# Patient Record
Sex: MALE | Race: WHITE | NOT HISPANIC OR LATINO | Employment: FULL TIME | URBAN - METROPOLITAN AREA
[De-identification: names, ages, dates, MRNs, and addresses within clinical notes are randomized per-mention and may not be internally consistent; named-entity substitution may affect disease eponyms.]

---

## 2023-10-14 ENCOUNTER — APPOINTMENT (OUTPATIENT)
Dept: RADIOLOGY | Facility: MEDICAL CENTER | Age: 43
End: 2023-10-14
Attending: STUDENT IN AN ORGANIZED HEALTH CARE EDUCATION/TRAINING PROGRAM
Payer: COMMERCIAL

## 2023-10-14 ENCOUNTER — HOSPITAL ENCOUNTER (EMERGENCY)
Facility: MEDICAL CENTER | Age: 43
End: 2023-10-14
Attending: STUDENT IN AN ORGANIZED HEALTH CARE EDUCATION/TRAINING PROGRAM
Payer: COMMERCIAL

## 2023-10-14 ENCOUNTER — OFFICE VISIT (OUTPATIENT)
Dept: URGENT CARE | Facility: CLINIC | Age: 43
End: 2023-10-14
Payer: COMMERCIAL

## 2023-10-14 VITALS
HEIGHT: 73 IN | TEMPERATURE: 97.1 F | HEART RATE: 99 BPM | WEIGHT: 194 LBS | BODY MASS INDEX: 25.71 KG/M2 | OXYGEN SATURATION: 96 % | RESPIRATION RATE: 16 BRPM | SYSTOLIC BLOOD PRESSURE: 128 MMHG | DIASTOLIC BLOOD PRESSURE: 80 MMHG

## 2023-10-14 VITALS
RESPIRATION RATE: 16 BRPM | OXYGEN SATURATION: 96 % | SYSTOLIC BLOOD PRESSURE: 133 MMHG | BODY MASS INDEX: 26.24 KG/M2 | WEIGHT: 198.85 LBS | HEART RATE: 77 BPM | TEMPERATURE: 97.7 F | DIASTOLIC BLOOD PRESSURE: 75 MMHG

## 2023-10-14 DIAGNOSIS — S61.215A LACERATION OF LEFT RING FINGER WITHOUT FOREIGN BODY WITHOUT DAMAGE TO NAIL, INITIAL ENCOUNTER: ICD-10-CM

## 2023-10-14 DIAGNOSIS — S61.412A LACERATION OF LEFT HAND, FOREIGN BODY PRESENCE UNSPECIFIED, INITIAL ENCOUNTER: ICD-10-CM

## 2023-10-14 PROCEDURE — 90715 TDAP VACCINE 7 YRS/> IM: CPT | Performed by: STUDENT IN AN ORGANIZED HEALTH CARE EDUCATION/TRAINING PROGRAM

## 2023-10-14 PROCEDURE — 99205 OFFICE O/P NEW HI 60 MIN: CPT

## 2023-10-14 PROCEDURE — 3074F SYST BP LT 130 MM HG: CPT

## 2023-10-14 PROCEDURE — 99283 EMERGENCY DEPT VISIT LOW MDM: CPT

## 2023-10-14 PROCEDURE — 3079F DIAST BP 80-89 MM HG: CPT

## 2023-10-14 PROCEDURE — 90471 IMMUNIZATION ADMIN: CPT

## 2023-10-14 PROCEDURE — 304999 HCHG REPAIR-SIMPLE/INTERMED LEVEL 1

## 2023-10-14 PROCEDURE — 304217 HCHG IRRIGATION SYSTEM

## 2023-10-14 PROCEDURE — 73130 X-RAY EXAM OF HAND: CPT | Mod: LT

## 2023-10-14 PROCEDURE — 700111 HCHG RX REV CODE 636 W/ 250 OVERRIDE (IP): Performed by: STUDENT IN AN ORGANIZED HEALTH CARE EDUCATION/TRAINING PROGRAM

## 2023-10-14 PROCEDURE — 303747 HCHG EXTRA SUTURE

## 2023-10-14 RX ORDER — ATORVASTATIN CALCIUM 40 MG/1
TABLET, FILM COATED ORAL
COMMUNITY
Start: 2023-09-09

## 2023-10-14 RX ORDER — OXYCODONE HCL 10 MG/1
10 TABLET, FILM COATED, EXTENDED RELEASE ORAL ONCE
Status: DISCONTINUED | OUTPATIENT
Start: 2023-10-14 | End: 2023-10-14

## 2023-10-14 RX ADMIN — CLOSTRIDIUM TETANI TOXOID ANTIGEN (FORMALDEHYDE INACTIVATED), CORYNEBACTERIUM DIPHTHERIAE TOXOID ANTIGEN (FORMALDEHYDE INACTIVATED), BORDETELLA PERTUSSIS TOXOID ANTIGEN (GLUTARALDEHYDE INACTIVATED), BORDETELLA PERTUSSIS FILAMENTOUS HEMAGGLUTININ ANTIGEN (FORMALDEHYDE INACTIVATED), BORDETELLA PERTUSSIS PERTACTIN ANTIGEN, AND BORDETELLA PERTUSSIS FIMBRIAE 2/3 ANTIGEN 0.5 ML: 5; 2; 2.5; 5; 3; 5 INJECTION, SUSPENSION INTRAMUSCULAR at 18:57

## 2023-10-14 RX ADMIN — LIDOCAINE HYDROCHLORIDE 20 ML: 10 INJECTION, SOLUTION EPIDURAL; INFILTRATION; INTRACAUDAL at 19:01

## 2023-10-14 ASSESSMENT — ENCOUNTER SYMPTOMS
NAUSEA: 0
CHILLS: 0
FEVER: 0
VOMITING: 0

## 2023-10-15 NOTE — ED NOTES
ERP educated patient on discharge instructions. Patient instructed to follow up with surgeon and return to ED if any new or worsening symptoms occur. Patient verbalized understanding of instructions and signed statement of discharge paperwork (AVS) being received.

## 2023-10-15 NOTE — ED PROVIDER NOTES
"ED Provider Note    CHIEF COMPLAINT  Chief Complaint   Patient presents with    Hand Injury     Left hand  Pt states, \"I impaled by hand while working on my house.\"       EXTERNAL RECORDS REVIEWED  Patient seen at urgent care prior to arrival. There was concern for possible extensor tendon injury and patient was referred to the emergency department.    HPI/ROS  LIMITATION TO HISTORY   None  OUTSIDE HISTORIAN(S):  None    Girma Lazaro is a 43 y.o. male who presents with left hand laceration.  Patient says that he was doing some renovation at home and demolishing a tub when he hit the top with a sledgehammer and a piece of porcelain cut through his left hand.  He has full movement of his fingers.  He has no numbness.   He denies any other injuries.  He is right-hand dominant. Unknown last Tdap.     PAST MEDICAL HISTORY   Denies    SURGICAL HISTORY  patient denies any surgical history    FAMILY HISTORY  No family history on file.    SOCIAL HISTORY  Social History     Tobacco Use    Smoking status: Some Days     Types: Cigarettes, Cigars    Smokeless tobacco: Never   Vaping Use    Vaping Use: Never used   Substance and Sexual Activity    Alcohol use: Yes    Drug use: Not Currently    Sexual activity: Not on file       CURRENT MEDICATIONS  Home Medications       Reviewed by Natali Pitt R.N. (Registered Nurse) on 10/14/23 at 1749  Med List Status: Not Addressed     Medication Last Dose Status   atorvastatin (LIPITOR) 40 MG Tab  Active                    ALLERGIES  No Known Allergies    PHYSICAL EXAM  VITAL SIGNS: /75   Pulse 77   Temp 36.5 °C (97.7 °F) (Temporal)   Resp 16   Wt 90.2 kg (198 lb 13.7 oz)   SpO2 96%   BMI 26.24 kg/m²    Constitutional: Awake and alert. Nontoxic  HENT:  Grossly normal  Eyes: Grossly normal  Neck: Normal range of motion  Cardiovascular: Normal heart rate   Thorax & Lungs: No respiratory distress  Abdomen: Nontender  Skin:  No pathologic rash.   Extremities: He has " approximately 1.5 cm laceration to the dorsal aspect of left hand overlying the fourth MCP.  There is exposure of the underlying soft tissue.  No visible tendon laceration or exposure of the underlying bone.  2 point discrimination intact in the affected digit.  Full ROM intact at the PIP and DIP of affected digit through flexion and extension. Brisk cap refill.   Psychiatric: Affect normal      DIAGNOSTIC STUDIES / PROCEDURES        RADIOLOGY  I have independently interpreted the diagnostic imaging associated with this visit and am waiting the final reading from the radiologist.   My preliminary interpretation is as follows: No obvious retained foreign object  Radiologist interpretation:   DX-HAND 3+ LEFT   Final Result      1.  No fracture or dislocation of LEFT hand.   2.  No radiopaque foreign body demonstrated.        Procedure - Laceration closure  Location: Left Hand, 4th MCP    Patient was positioned appropriately, 4cc lidocaine without epinephrine was used as a local anesthetic. 500cc NaCl was used for irrigation. Patient was sterile draped with wound exposed. 3 x 4.0 nylon sutures were placed with good approximation. Procedure tolerated without complications.       COURSE & MEDICAL DECISION MAKING    ED Observation Status? No; Patient does not meet criteria for ED Observation.     INITIAL ASSESSMENT, COURSE AND PLAN  Care Narrative: Patients vitals normal and is well appearing.  Physical exam is notable for 1.5 cm laceration of the left hand without exposure of underlying bone or tendon. Neurovascularly intact distal to injury.  X-ray obtained without any obvious fracture or retained foreign object.  Wound was explored and irrigated with 500cc normal saline, by myself without any evidence of retained foreign object or contamination.  Of note, patient was evaluated at urgent care prior to arrival and there was concern for possible extensor tendon injury.  Patient is able to extend his 4th finger on physical  exam which is reassuring.  I discussed with Dr. Mayo (Orthopedics) and explained to him that I was unable to visualize any extensor tendon injury and therefore would be unable to repair the tendon, if there were a more subtle partial tendon laceration.   He reassured me that he would not recommend repair of extensor tendon, especially given normal physical exam.  The laceration was repaired with sutures as above.  Laceration unlikely contaminated and no antibiotics indicated at this time. Tdap updated.  Neurosensory and circulatory exams same as prior to repair.  A clean dressing was applied and finger splint applied, in an abundance of caution.  I discussed wound care precautions and need for follow-up for suture removal.  All his questions were answered and he was discharged in good condition.      DISPOSITION AND DISCUSSIONS  I have discussed management of the patient with the following physicians and PIEDAD's:  Dr. Mayo (Orthopedics)    Discussion of management with other QHP or appropriate source(s): None     Decision tools and prescription drugs considered including, but not limited to: Antibiotics Not indicated .    FINAL DIAGNOSIS  1. Laceration of left ring finger without foreign body without damage to nail, initial encounter Acute          Electronically signed by: Tessy Garner M.D., 10/14/2023 6:32 PM

## 2023-10-15 NOTE — ED NOTES
Pt was demolishing a tub and brought the sledge hammer to hit the tub and a piece of porcelain cut thru his left hand, pt went to  and was told to come to get checked out

## 2023-10-15 NOTE — ED TRIAGE NOTES
"Girma Tacos Lazaro  43 y.o. male  Chief Complaint   Patient presents with    Hand Injury     Left hand  Pt states, \"I impaled by hand while working on my house.\"         Pt amb to triage with steady gait for above complaint. CMS intact to left hand  Pt is alert and oriented, speaking in full sentences, follows commands and responds appropriately to questions. Not in any apparent distress. Respirations are even and unlabored.  Pt placed in lobby. Pt educated on triage process. Pt encouraged to alert staff for any changes.    "

## 2023-10-15 NOTE — DISCHARGE INSTRUCTIONS
Your wound was repaired with stitches.  These will need to be removed in 10-14 days.     Please leave the current dressing in place for 24 hours. Then begin cleaning the wound with soap and water - do not scrub, but let the soapy water run over it.  Pat the wound dry; do not rub it.  Please do this twice daily and change the dressing after each clean. If the bandage becomes wet or dirty change it  Please see your primary care physician in that time window to have your wound evaluated to ensure it is healing properly.  If you cannot get an appointment with your primary physician, go to an Urgent Care or return to an Emergency Department for this. Do not soak/immerse the wound for at least 4-6 weeks - this means no hot tubs, pools, baths, or swimming until the wound has completely healed.    Scar formation is inevitable, but one of the greatest risks for scar formation is severe sun exposure or sunburns to the wounded area. For the next 9 months, please use hats, sunscreen, or other clothing to try and minimize the sun exposure to the wound. Some wounds can benefit from scar revision surgery, so please followup with your PCP or plastic surgeon as needed.     PAIN MEDICATIONS:   If you were prescribed pain medications, take them only as prescribed.  Do not take extra and do not use another person's pain medications.  Medications with opioids (e.g. Percocet, Vicodin, Norco, oxycodone, Dilaudid, etc..) can make you drowsy or confused.  Do not mix with alcohol.  Do not drive after taking these medications.     Often times, acetaminophen (Tylenol) or ibuprofen (Motrin, Advil, etc...) can be used as a first line pain regimen.  This is often sufficient for most pain associated with your wound.  If you do take these medications, especially Tylenol, make sure you do not mix this with other medications that contain Tylenol (e.g. Percocet).  Make sure you follow the instructions on the bottle to ensure you do not overdose on  these medications.     SEEK MEDICAL CARE IF:    You have redness, swelling, or increasing pain in the wound.  You see a red line that goes away from the wound.  You have yellowish-white fluid (pus) coming from the wound.  You have a fever.  You notice a bad smell coming from the wound or dressing.  Your wound breaks open before or after sutures have been removed.  You notice something coming out of the wound such as wood or glass.  Your wound is on your hand or foot and you cannot move a finger or toe.     SEEK IMMEDIATE MEDICAL CARE IF:    Your pain is not controlled with prescribed medicine.  You have severe swelling around the wound causing pain and numbness or a change in color in your arm, hand, leg, or foot.  Your wound splits open and starts bleeding.  You have worsening numbness, weakness, or loss of function of any joint around or beyond the wound.  You develop painful lumps near the wound or on the skin anywhere on your body.

## 2023-10-15 NOTE — PROGRESS NOTES
"Subjective:   Girma Lazaro is a 43 y.o. male who presents for Laceration (Today, LT ring finger laceration while using a sledge hammer.)          I introduced myself to the patient and informed them that I am a family nurse practitioner.    HPI:Girma comes in today c/o laceration to L hand. Onset was approximately an hour ago when he was working in the bathroom with a sledgehammer, swung down with a sledgehammer and impaled the back of his left hand on a sharp piece of ceramic.  He states he placed a gauze dressing on it and came to urgent care.    Review of Systems   Constitutional:  Negative for chills and fever.   Gastrointestinal:  Negative for nausea and vomiting.   Musculoskeletal:  Positive for joint pain.        Positive for joint pain left fourth MCP joint   Skin:         Positive for laceration left hand       Medications: atorvastatin Tabs    Allergies: Patient has no known allergies.    Problem List: does not have a problem list on file.    Surgical History:  No past surgical history on file.    Past Social Hx:   reports that he has been smoking cigarettes and cigars. He has never used smokeless tobacco. He reports current alcohol use. He reports that he does not currently use drugs.     Past Family Hx:   family history is not on file.     Problem list, medications, and allergies reviewed by myself today in Epic.     Objective:     /80   Pulse 99   Temp 36.2 °C (97.1 °F) (Temporal)   Resp 16   Ht 1.854 m (6' 1\")   Wt 88 kg (194 lb)   SpO2 96%   BMI 25.60 kg/m²     During this visit, appropriate PPE was worn, and hand hygiene was performed.    Physical Exam  Constitutional:       General: He is not in acute distress.     Appearance: Normal appearance. He is not ill-appearing, toxic-appearing or diaphoretic.   Musculoskeletal:         General: Tenderness and signs of injury present.   Skin:     Comments: Positive for approximately 1 cm laceration to left fourth dorsal MCP joint.  " There is no erythema or ecchymosis present.  NVID with cap refill to distal digit less than 3 seconds, radial pulse intact 2+, sensation to distal digits intact.  After irrigation and inspection of the wound it is apparent that it is a full-thickness injury with the MCP joint visible, appears to be a partial tear of the extensor tendon.   Neurological:      Mental Status: He is alert.         Assessment/Plan:     Diagnosis and associated orders:     No diagnosis found.   Comments/MDM:     1. Laceration of left hand, foreign body presence unspecified, initial encounter  Appears to be a full-thickness injury with the dorsal 4th MCP joint visible, appears to be a possible partial tear of the extensor tendon.  I instructed patient that this is not something we can repair at the urgent care, he needs to go to the emergency room immediately to see an orthopedic hand specialist for a higher level of care.  He states he understands and is agreeable.  I offered to call [facility] to take him, he refuses saying he drove here and he is fine to drive to the emergency room.  He states he does not feel nauseous or faint, his vital signs are stable and reassuring.  I did call the [facility] transfer center and give report.         Pt is clinically not stable at today's acute urgent care visit, transferred to the emergency department for higher level of care.    Please note that this dictation was created using voice recognition software. I have made a reasonable attempt to correct obvious errors, but I expect that there are errors of grammar and possibly content that I did not discover before finalizing the note.    This note was electronically signed by Ken FOFANA, HÉCTOR, CASH, ROWENA

## 2023-11-01 PROBLEM — S61.219A LACERATION OF FINGER OF LEFT HAND WITH TENDON INVOLVEMENT: Status: ACTIVE | Noted: 2023-11-01

## 2024-01-25 ENCOUNTER — APPOINTMENT (RX ONLY)
Dept: URBAN - METROPOLITAN AREA CLINIC 4 | Facility: CLINIC | Age: 44
Setting detail: DERMATOLOGY
End: 2024-01-25

## 2024-01-25 DIAGNOSIS — D22 MELANOCYTIC NEVI: ICD-10-CM

## 2024-01-25 DIAGNOSIS — D18.0 HEMANGIOMA: ICD-10-CM

## 2024-01-25 DIAGNOSIS — L81.4 OTHER MELANIN HYPERPIGMENTATION: ICD-10-CM

## 2024-01-25 DIAGNOSIS — B07.8 OTHER VIRAL WARTS: ICD-10-CM

## 2024-01-25 DIAGNOSIS — L08.9 LOCAL INFECTION OF THE SKIN AND SUBCUTANEOUS TISSUE, UNSPECIFIED: ICD-10-CM

## 2024-01-25 DIAGNOSIS — Z71.89 OTHER SPECIFIED COUNSELING: ICD-10-CM

## 2024-01-25 DIAGNOSIS — L85.3 XEROSIS CUTIS: ICD-10-CM

## 2024-01-25 DIAGNOSIS — L82.1 OTHER SEBORRHEIC KERATOSIS: ICD-10-CM

## 2024-01-25 PROBLEM — D48.5 NEOPLASM OF UNCERTAIN BEHAVIOR OF SKIN: Status: ACTIVE | Noted: 2024-01-25

## 2024-01-25 PROBLEM — D18.01 HEMANGIOMA OF SKIN AND SUBCUTANEOUS TISSUE: Status: ACTIVE | Noted: 2024-01-25

## 2024-01-25 PROBLEM — D22.5 MELANOCYTIC NEVI OF TRUNK: Status: ACTIVE | Noted: 2024-01-25

## 2024-01-25 PROCEDURE — ? BIOPSY BY SHAVE METHOD

## 2024-01-25 PROCEDURE — 99203 OFFICE O/P NEW LOW 30 MIN: CPT | Mod: 25

## 2024-01-25 PROCEDURE — ? PRESCRIPTION

## 2024-01-25 PROCEDURE — ? COUNSELING

## 2024-01-25 PROCEDURE — 11102 TANGNTL BX SKIN SINGLE LES: CPT

## 2024-01-25 PROCEDURE — ? ADDITIONAL NOTES

## 2024-01-25 RX ORDER — MUPIROCIN 20 MG/G
OINTMENT TOPICAL BID
Qty: 15 | Refills: 0 | Status: ERX | COMMUNITY
Start: 2024-01-25

## 2024-01-25 RX ADMIN — MUPIROCIN: 20 OINTMENT TOPICAL at 00:00

## 2024-01-25 ASSESSMENT — LOCATION SIMPLE DESCRIPTION DERM
LOCATION SIMPLE: RIGHT GREAT TOE
LOCATION SIMPLE: CHEST
LOCATION SIMPLE: RIGHT UPPER BACK
LOCATION SIMPLE: RIGHT ACHILLES SKIN

## 2024-01-25 ASSESSMENT — LOCATION ZONE DERM
LOCATION ZONE: TRUNK
LOCATION ZONE: LEG
LOCATION ZONE: TOE

## 2024-01-25 ASSESSMENT — LOCATION DETAILED DESCRIPTION DERM
LOCATION DETAILED: RIGHT DORSAL GREAT TOE
LOCATION DETAILED: RIGHT SUPERIOR MEDIAL UPPER BACK
LOCATION DETAILED: RIGHT ACHILLES SKIN
LOCATION DETAILED: RIGHT INFERIOR UPPER BACK
LOCATION DETAILED: UPPER STERNUM
LOCATION DETAILED: RIGHT MID-UPPER BACK
LOCATION DETAILED: MIDDLE STERNUM
LOCATION DETAILED: LEFT MEDIAL SUPERIOR CHEST

## 2024-03-18 ENCOUNTER — APPOINTMENT (RX ONLY)
Dept: URBAN - METROPOLITAN AREA CLINIC 4 | Facility: CLINIC | Age: 44
Setting detail: DERMATOLOGY
End: 2024-03-18

## 2024-03-18 DIAGNOSIS — B07.8 OTHER VIRAL WARTS: ICD-10-CM

## 2024-03-18 PROCEDURE — ? BIOPSY BY SHAVE METHOD

## 2024-03-18 PROCEDURE — 11102 TANGNTL BX SKIN SINGLE LES: CPT

## 2024-03-18 ASSESSMENT — LOCATION DETAILED DESCRIPTION DERM: LOCATION DETAILED: RIGHT ACHILLES SKIN

## 2024-03-18 ASSESSMENT — LOCATION SIMPLE DESCRIPTION DERM: LOCATION SIMPLE: RIGHT ACHILLES SKIN

## 2024-03-18 ASSESSMENT — LOCATION ZONE DERM: LOCATION ZONE: LEG

## 2024-04-23 PROCEDURE — 88230 TISSUE CULTURE LYMPHOCYTE: CPT

## 2024-04-23 PROCEDURE — 88262 CHROMOSOME ANALYSIS 15-20: CPT

## 2024-05-03 LAB
KARYOTYP BLD/T: NORMAL
PATHOLOGY STUDY: NORMAL

## 2025-04-26 ENCOUNTER — OFFICE VISIT (OUTPATIENT)
Dept: URGENT CARE | Facility: CLINIC | Age: 45
End: 2025-04-26
Payer: COMMERCIAL

## 2025-04-26 VITALS
TEMPERATURE: 97 F | DIASTOLIC BLOOD PRESSURE: 78 MMHG | BODY MASS INDEX: 27.09 KG/M2 | RESPIRATION RATE: 16 BRPM | OXYGEN SATURATION: 97 % | SYSTOLIC BLOOD PRESSURE: 130 MMHG | WEIGHT: 200 LBS | HEIGHT: 72 IN | HEART RATE: 66 BPM

## 2025-04-26 DIAGNOSIS — H60.501 ACUTE OTITIS EXTERNA OF RIGHT EAR, UNSPECIFIED TYPE: ICD-10-CM

## 2025-04-26 PROCEDURE — 3075F SYST BP GE 130 - 139MM HG: CPT | Performed by: STUDENT IN AN ORGANIZED HEALTH CARE EDUCATION/TRAINING PROGRAM

## 2025-04-26 PROCEDURE — 3078F DIAST BP <80 MM HG: CPT | Performed by: STUDENT IN AN ORGANIZED HEALTH CARE EDUCATION/TRAINING PROGRAM

## 2025-04-26 PROCEDURE — 99213 OFFICE O/P EST LOW 20 MIN: CPT | Performed by: STUDENT IN AN ORGANIZED HEALTH CARE EDUCATION/TRAINING PROGRAM

## 2025-04-26 RX ORDER — CIPROFLOXACIN AND DEXAMETHASONE 3; 1 MG/ML; MG/ML
4 SUSPENSION/ DROPS AURICULAR (OTIC) 2 TIMES DAILY
Qty: 7.5 ML | Refills: 0 | Status: SHIPPED | OUTPATIENT
Start: 2025-04-26 | End: 2025-05-01

## 2025-04-26 ASSESSMENT — ENCOUNTER SYMPTOMS
CHILLS: 0
FEVER: 0

## 2025-04-26 NOTE — PROGRESS NOTES
Subjective:   Girma Lazaro is a 45 y.o. male who presents for Ear Pain (Right side dull ear pain)      HPI:  45-year-old male presents with right ear pain dull ache and pressure for the past few weeks.  He is unsure if he has an infection.  He is also reporting & significant ear itching unable to get under control has been scratching at his ear no significant discharge from the ear.  He does report some mild ear pain and dullness.  He also reports pain in his right jaw.  He reports a few weeks ago he was eating something and he felt a clicking sensation in his TMJ area.  And ever since then the pain has been present as well as pain in his ear.  He is unsure which one happened first.  - No fevers no chills no significant sinus congestion or pressure.    Review of Systems   Constitutional:  Negative for chills and fever.   HENT:  Positive for ear pain. Negative for hearing loss and tinnitus.        Medications:    atorvastatin Tabs  CIALIS PO    Allergies: Patient has no known allergies.    Problem List: Girma Lazaro does not have any pertinent problems on file.    Surgical History:  Past Surgical History:   Procedure Laterality Date    PB REPAIR EXTEN TENDON,DORSUM FINGR,EA Left 11/7/2023    Procedure: LEFT RING FINGER TENDON EXTENSOR REPAIR;  Surgeon: Tirso Huerta M.D.;  Location: [facility] [place];  Service: Orthopedics       Past Social Hx: Girma Lazaro  reports that he has been smoking cigarettes and cigars. He has never used smokeless tobacco. He reports current alcohol use of about 6.0 oz of alcohol per week. He reports that he does not currently use drugs after having used the following drugs: Oral.     Past Family Hx:  Girma Lazaro family history is not on file.     Problem list, medications, and allergies reviewed by myself today in Epic.     Objective:     /78 (BP Location: Left arm, Patient Position: Sitting, BP Cuff Size: Adult)   Pulse 66   Temp  36.1 °C (97 °F) (Temporal)   Resp 16   Ht 1.829 m (6')   Wt 90.7 kg (200 lb)   SpO2 97%   BMI 27.12 kg/m²     Physical Exam  Constitutional:       Appearance: Normal appearance.   HENT:      Head: Normocephalic and atraumatic.      Jaw: Trismus, tenderness and pain on movement present.        Comments: Tenderness palpation over right TMJ area with jaw movement.  None on rest.     Right Ear: Tympanic membrane normal.      Left Ear: Tympanic membrane normal.      Ears:      Comments: Right external ear erythematous and dry.     Nose: Nose normal. No congestion or rhinorrhea.   Neurological:      Mental Status: He is alert.         Assessment/Plan:     Diagnosis and associated orders:     1. Acute otitis externa of right ear, unspecified type  ciprofloxacin/dexamethasone (CIPRODEX) 0.3-0.1 % Suspension         Comments/MDM:     1. Acute otitis externa of right ear, unspecified type  I discussed with patient that it does appear like he might have a slight infection of his other ear considering how itchy it is as well as how red it was on examination.  Will treat with Ciprodex as above.  - I would recommend for the ear pressure he is feeling increase use of Flonase as well as a daily antihistamine for possible eustachian tube dysfunction.  - I also discussed seeing a TMJ specialist given his joint pain that he is feeling that also radiate out to his ear that might have been the primary cause of all this discomfort he is feeling.  - Patient verbalized understanding he has a follow-up appointment with his primary care provider to establish next week.  Any significant worsening of symptoms development of fever chills or worsening concerns I would recommend discussing with his PCP.  - ciprofloxacin/dexamethasone (CIPRODEX) 0.3-0.1 % Suspension; Administer 4 Drops into affected ear(s) 2 times a day for 5 days.  Dispense: 7.5 mL; Refill: 0           Differential diagnosis, natural history, supportive care, and indications  for immediate follow-up discussed.    Advised the patient to follow-up with the primary care physician for recheck, reevaluation, and consideration of further management.    Please note that this dictation was created using voice recognition software. I have made a reasonable attempt to correct obvious errors, but I expect that there are errors of grammar and possibly content that I did not discover before finalizing the note.    Vinay Conner M.D.

## 2025-05-21 ENCOUNTER — TELEPHONE (OUTPATIENT)
Dept: HEALTH INFORMATION MANAGEMENT | Facility: OTHER | Age: 45
End: 2025-05-21
Payer: COMMERCIAL

## 2025-05-29 ENCOUNTER — TELEPHONE (OUTPATIENT)
Dept: CARDIOLOGY | Facility: MEDICAL CENTER | Age: 45
End: 2025-05-29

## 2025-05-29 ENCOUNTER — OFFICE VISIT (OUTPATIENT)
Dept: CARDIOLOGY | Facility: MEDICAL CENTER | Age: 45
End: 2025-05-29
Attending: INTERNAL MEDICINE
Payer: COMMERCIAL

## 2025-05-29 VITALS
BODY MASS INDEX: 27.5 KG/M2 | WEIGHT: 203 LBS | HEIGHT: 72 IN | HEART RATE: 82 BPM | DIASTOLIC BLOOD PRESSURE: 78 MMHG | OXYGEN SATURATION: 96 % | RESPIRATION RATE: 14 BRPM | SYSTOLIC BLOOD PRESSURE: 108 MMHG

## 2025-05-29 DIAGNOSIS — I25.10 CORONARY ARTERIOSCLEROSIS: Primary | ICD-10-CM

## 2025-05-29 DIAGNOSIS — E78.5 HYPERLIPIDEMIA, UNSPECIFIED HYPERLIPIDEMIA TYPE: ICD-10-CM

## 2025-05-29 DIAGNOSIS — Z82.49 FAMILY HISTORY OF CORONARY ARTERY DISEASE: ICD-10-CM

## 2025-05-29 LAB — EKG IMPRESSION: NORMAL

## 2025-05-29 PROCEDURE — 99213 OFFICE O/P EST LOW 20 MIN: CPT | Performed by: INTERNAL MEDICINE

## 2025-05-29 PROCEDURE — 93005 ELECTROCARDIOGRAM TRACING: CPT | Mod: TC | Performed by: INTERNAL MEDICINE

## 2025-05-29 RX ORDER — ATORVASTATIN CALCIUM 20 MG/1
20 TABLET, FILM COATED ORAL
Qty: 100 TABLET | Refills: 3 | Status: SHIPPED | OUTPATIENT
Start: 2025-05-29

## 2025-05-29 NOTE — PROGRESS NOTES
INTERVENTIONAL CARDIOLOGY NEW PATIENT CONSULTATION    PCP: Keily Liu P.A.-C.    1. Coronary arteriosclerosis    2. Family history of coronary artery disease    3. Hyperlipidemia, unspecified hyperlipidemia type        Girma Lazaro has chest discomfort, potentially consistent with angina.  I advised he complete a stress echocardiogram.  I will obtain the record of the angiogram performed in [facility].  I provided a prescription for atorvastatin 20 mg nightly.        Follow up: 1 year      History: Girma Lazaro is a 45 y.o. male with history of coronary arterial sclerosis, family history of early coronary artery disease with his father and grandfather dying at the age of 52 of a heart attack-his father dying in front of them during failed CPR effort.    He previously received care in [facility].  He reports an angiogram in 2021 after a stress MPI showed inferior ischemia.    He has had intermittent discomfort in the chest-at times with activity.  Has been active but is cautious of being more active      PE:  /78 (BP Location: Left arm, Patient Position: Sitting, BP Cuff Size: Adult)   Pulse 82   Resp 14   Ht 1.829 m (6')   Wt 92.1 kg (203 lb)   SpO2 96%   BMI 27.53 kg/m²   GEN: NAD  RESP: CTAB  CVS: RRR, No M/R/G  ABD: Soft, NT/ND  EXT: WWP, no edema    () Today's E/M visit is associated with medical care services that serve as the continuing focal point for all needed health care services and/or with medical care services that  are part of ongoing care related to a patient's single, serious condition, or a complex condition: This includes  furnishing services to patients on an ongoing basis that result in care that is personalized  to the patient. The services result in a comprehensive, longitudinal, and continuous  relationship with the patient and involve delivery of team-based care that is accessible, coordinated with other practitioners and providers,  "and integrated with the broader health  care landscape.     The ASCVD Risk score (Nabeel MAHARAJ, et al., 2019) failed to calculate.    Studies  No results found for: \"CHOLSTRLTOT\", \"LDL\", \"HDL\", \"TRIGLYCERIDE\"    No results found for: \"SODIUM\", \"POTASSIUM\", \"CHLORIDE\", \"CO2\", \"GLUCOSE\", \"BUN\", \"CREATININE\", \"BUNCREATRAT\", \"GLOMRATE\"   No results found for: \"PROTHROMBTM\", \"INR\"   No results found for: \"WBC\", \"RBC\", \"HEMOGLOBIN\", \"HEMATOCRIT\", \"MCV\", \"MCH\", \"MCHC\", \"MPV\", \"NEUTSPOLYS\", \"LYMPHOCYTES\", \"MONOCYTES\", \"EOSINOPHILS\", \"BASOPHILS\", \"HYPOCHROMIA\", \"ANISOCYTOSIS\"     Past Medical History[1]  Past Surgical History[2]  Allergies[3]  Encounter Medications[4]  Social History     Socioeconomic History    Marital status:      Spouse name: Not on file    Number of children: Not on file    Years of education: Not on file    Highest education level: Not on file   Occupational History    Not on file   Tobacco Use    Smoking status: Some Days     Types: Cigarettes, Cigars    Smokeless tobacco: Never   Vaping Use    Vaping status: Never Used   Substance and Sexual Activity    Alcohol use: Yes     Alcohol/week: 6.0 oz     Types: 10 Cans of beer per week     Comment: once a week    Drug use: Not Currently     Types: Oral     Comment: THC 2 weeks ago    Sexual activity: Not on file   Other Topics Concern    Not on file   Social History Narrative    Not on file     Social Drivers of Health     Financial Resource Strain: Not on file   Food Insecurity: Not on file   Transportation Needs: Not on file   Physical Activity: Not on file   Stress: Not on file   Social Connections: Not on file   Intimate Partner Violence: Not on file   Housing Stability: Not on file     History reviewed. No pertinent family history.    Chief Complaint   Patient presents with    Hyperlipidemia    Angina (Stable)       ROS:   10 point review systems is otherwise negative except as per the HPI       [1]   Past Medical History:  Diagnosis Date    High " cholesterol    [2]   Past Surgical History:  Procedure Laterality Date    PB REPAIR EXTEN TENDON,DORSUM FINGR,EA Left 11/7/2023    Procedure: LEFT RING FINGER TENDON EXTENSOR REPAIR;  Surgeon: Tirso Huerta M.D.;  Location: [facility];  Service: Orthopedics   [3] No Known Allergies  [4]   Outpatient Encounter Medications as of 5/29/2025   Medication Sig Dispense Refill    atorvastatin (LIPITOR) 20 MG Tab Take 1 Tablet by mouth at bedtime. 100 Tablet 3    Tadalafil (CIALIS PO) Take  by mouth. PRN      [DISCONTINUED] atorvastatin (LIPITOR) 40 MG Tab  (Patient not taking: Reported on 5/29/2025)       No facility-administered encounter medications on file as of 5/29/2025.

## 2025-06-12 ENCOUNTER — HOSPITAL ENCOUNTER (OUTPATIENT)
Dept: CARDIOLOGY | Facility: MEDICAL CENTER | Age: 45
End: 2025-06-12
Attending: INTERNAL MEDICINE
Payer: COMMERCIAL

## 2025-06-12 ENCOUNTER — RESULTS FOLLOW-UP (OUTPATIENT)
Dept: CARDIOLOGY | Facility: MEDICAL CENTER | Age: 45
End: 2025-06-12

## 2025-06-12 DIAGNOSIS — I25.10 CORONARY ARTERIOSCLEROSIS: ICD-10-CM

## 2025-06-12 LAB — LV EJECT FRACT  99904: 60

## 2025-06-12 PROCEDURE — 93018 CV STRESS TEST I&R ONLY: CPT | Performed by: INTERNAL MEDICINE

## 2025-06-12 PROCEDURE — 93350 STRESS TTE ONLY: CPT | Mod: 26 | Performed by: INTERNAL MEDICINE

## 2025-06-12 PROCEDURE — 93017 CV STRESS TEST TRACING ONLY: CPT | Mod: TC

## 2025-06-23 NOTE — TELEPHONE ENCOUNTER
Phone Number Called: [phone]      Call outcome: Left detailed message for patient. Informed to call back with any additional questions.    Message: Called to inform patient of BE review of stress test.